# Patient Record
Sex: FEMALE | Race: ASIAN | Employment: FULL TIME | URBAN - METROPOLITAN AREA
[De-identification: names, ages, dates, MRNs, and addresses within clinical notes are randomized per-mention and may not be internally consistent; named-entity substitution may affect disease eponyms.]

---

## 2021-06-24 LAB
HBSAG, EXTERNAL: NEGATIVE
HIV, EXTERNAL: NEGATIVE
RPR, EXTERNAL: NONREACTIVE
RUBELLA, EXTERNAL: NORMAL
TYPE, ABO & RH, EXTERNAL: NORMAL

## 2021-08-02 PROBLEM — Q12.0 CONGENITAL CATARACT: Status: ACTIVE | Noted: 2019-06-27

## 2021-08-02 PROBLEM — Z34.00 ENCOUNTER FOR SUPERVISION OF NORMAL FIRST PREGNANCY: Status: ACTIVE | Noted: 2021-07-15

## 2021-08-02 PROBLEM — O09.92 HIGH-RISK PREGNANCY IN SECOND TRIMESTER: Status: ACTIVE | Noted: 2021-08-02

## 2021-08-02 PROBLEM — Z14.8 CARRIER OF FRAGILE X SYNDROME: Status: ACTIVE | Noted: 2021-08-02

## 2021-08-02 PROBLEM — Z87.798 HISTORY OF CONGENITAL CATARACT: Status: ACTIVE | Noted: 2019-06-27

## 2021-08-08 PROBLEM — Z3A.16 16 WEEKS GESTATION OF PREGNANCY: Status: ACTIVE | Noted: 2021-08-08

## 2021-12-21 LAB — GRBS, EXTERNAL: NEGATIVE

## 2022-01-10 ENCOUNTER — HOSPITAL ENCOUNTER (INPATIENT)
Age: 32
LOS: 2 days | Discharge: HOME | End: 2022-01-12
Attending: OBSTETRICS & GYNECOLOGY | Admitting: OBSTETRICS & GYNECOLOGY
Payer: COMMERCIAL

## 2022-01-10 DIAGNOSIS — R52 PAIN: Primary | ICD-10-CM

## 2022-01-10 PROBLEM — R10.9 ABDOMINAL PAIN DURING PREGNANCY IN THIRD TRIMESTER: Status: ACTIVE | Noted: 2022-01-10

## 2022-01-10 PROBLEM — O26.893 ABDOMINAL PAIN DURING PREGNANCY IN THIRD TRIMESTER: Status: ACTIVE | Noted: 2022-01-10

## 2022-01-10 LAB
ABO + RH BLD: NORMAL
BLOOD GROUP ANTIBODIES SERPL: NORMAL
ERYTHROCYTE [DISTWIDTH] IN BLOOD BY AUTOMATED COUNT: 12.8 % (ref 11.9–14.6)
HCT VFR BLD AUTO: 38 % (ref 35.8–46.3)
HGB BLD-MCNC: 12.8 G/DL (ref 11.7–15.4)
MCH RBC QN AUTO: 30.8 PG (ref 26.1–32.9)
MCHC RBC AUTO-ENTMCNC: 33.7 G/DL (ref 31.4–35)
MCV RBC AUTO: 91.3 FL (ref 79.6–97.8)
NRBC # BLD: 0 K/UL (ref 0–0.2)
PLATELET # BLD AUTO: 300 K/UL (ref 150–450)
PMV BLD AUTO: 9.6 FL (ref 9.4–12.3)
RBC # BLD AUTO: 4.16 M/UL (ref 4.05–5.2)
SPECIMEN EXP DATE BLD: NORMAL
WBC # BLD AUTO: 13.8 K/UL (ref 4.3–11.1)

## 2022-01-10 PROCEDURE — 84112 EVAL AMNIOTIC FLUID PROTEIN: CPT

## 2022-01-10 PROCEDURE — 59025 FETAL NON-STRESS TEST: CPT

## 2022-01-10 PROCEDURE — 75410000000 HC DELIVERY VAGINAL/SINGLE

## 2022-01-10 PROCEDURE — 36415 COLL VENOUS BLD VENIPUNCTURE: CPT

## 2022-01-10 PROCEDURE — 2709999900 HC NON-CHARGEABLE SUPPLY

## 2022-01-10 PROCEDURE — 74011250637 HC RX REV CODE- 250/637: Performed by: OBSTETRICS & GYNECOLOGY

## 2022-01-10 PROCEDURE — 77030018846 HC SOL IRR STRL H20 ICUM -A: Performed by: STUDENT IN AN ORGANIZED HEALTH CARE EDUCATION/TRAINING PROGRAM

## 2022-01-10 PROCEDURE — 4A1HXCZ MONITORING OF PRODUCTS OF CONCEPTION, CARDIAC RATE, EXTERNAL APPROACH: ICD-10-PCS | Performed by: STUDENT IN AN ORGANIZED HEALTH CARE EDUCATION/TRAINING PROGRAM

## 2022-01-10 PROCEDURE — 86900 BLOOD TYPING SEROLOGIC ABO: CPT

## 2022-01-10 PROCEDURE — 74011000250 HC RX REV CODE- 250: Performed by: OBSTETRICS & GYNECOLOGY

## 2022-01-10 PROCEDURE — 75410000002 HC LABOR FEE PER 1 HR

## 2022-01-10 PROCEDURE — 0HQ9XZZ REPAIR PERINEUM SKIN, EXTERNAL APPROACH: ICD-10-PCS | Performed by: STUDENT IN AN ORGANIZED HEALTH CARE EDUCATION/TRAINING PROGRAM

## 2022-01-10 PROCEDURE — 75410000003 HC RECOV DEL/VAG/CSECN EA 0.5 HR

## 2022-01-10 PROCEDURE — 74011250637 HC RX REV CODE- 250/637: Performed by: STUDENT IN AN ORGANIZED HEALTH CARE EDUCATION/TRAINING PROGRAM

## 2022-01-10 PROCEDURE — 77030011943: Performed by: STUDENT IN AN ORGANIZED HEALTH CARE EDUCATION/TRAINING PROGRAM

## 2022-01-10 PROCEDURE — 85027 COMPLETE CBC AUTOMATED: CPT

## 2022-01-10 PROCEDURE — 77030003028 HC SUT VCRL J&J -A: Performed by: STUDENT IN AN ORGANIZED HEALTH CARE EDUCATION/TRAINING PROGRAM

## 2022-01-10 PROCEDURE — 99284 EMERGENCY DEPT VISIT MOD MDM: CPT | Performed by: OBSTETRICS & GYNECOLOGY

## 2022-01-10 PROCEDURE — 65270000029 HC RM PRIVATE

## 2022-01-10 RX ORDER — MINERAL OIL
120 OIL (ML) ORAL
Status: DISCONTINUED | OUTPATIENT
Start: 2022-01-10 | End: 2022-01-10

## 2022-01-10 RX ORDER — PRENATAL VIT 96/IRON FUM/FOLIC 27MG-0.8MG
1 TABLET ORAL DAILY
Status: DISCONTINUED | OUTPATIENT
Start: 2022-01-11 | End: 2022-01-12 | Stop reason: HOSPADM

## 2022-01-10 RX ORDER — OXYTOCIN/RINGER'S LACTATE 30/500 ML
87.3 PLASTIC BAG, INJECTION (ML) INTRAVENOUS AS NEEDED
Status: DISCONTINUED | OUTPATIENT
Start: 2022-01-10 | End: 2022-01-10

## 2022-01-10 RX ORDER — OXYCODONE HYDROCHLORIDE 5 MG/1
5 TABLET ORAL
Status: DISCONTINUED | OUTPATIENT
Start: 2022-01-10 | End: 2022-01-12 | Stop reason: HOSPADM

## 2022-01-10 RX ORDER — OXYTOCIN/RINGER'S LACTATE 30/500 ML
10 PLASTIC BAG, INJECTION (ML) INTRAVENOUS AS NEEDED
Status: COMPLETED | OUTPATIENT
Start: 2022-01-10 | End: 2022-01-10

## 2022-01-10 RX ORDER — ONDANSETRON 4 MG/1
4 TABLET, ORALLY DISINTEGRATING ORAL
Status: DISCONTINUED | OUTPATIENT
Start: 2022-01-10 | End: 2022-01-12 | Stop reason: HOSPADM

## 2022-01-10 RX ORDER — SODIUM CHLORIDE 0.9 % (FLUSH) 0.9 %
5-40 SYRINGE (ML) INJECTION EVERY 8 HOURS
Status: DISCONTINUED | OUTPATIENT
Start: 2022-01-10 | End: 2022-01-10

## 2022-01-10 RX ORDER — ADHESIVE BANDAGE
30 BANDAGE TOPICAL DAILY PRN
Status: DISCONTINUED | OUTPATIENT
Start: 2022-01-10 | End: 2022-01-12 | Stop reason: HOSPADM

## 2022-01-10 RX ORDER — DEXTROSE, SODIUM CHLORIDE, SODIUM LACTATE, POTASSIUM CHLORIDE, AND CALCIUM CHLORIDE 5; .6; .31; .03; .02 G/100ML; G/100ML; G/100ML; G/100ML; G/100ML
125 INJECTION, SOLUTION INTRAVENOUS CONTINUOUS
Status: DISCONTINUED | OUTPATIENT
Start: 2022-01-10 | End: 2022-01-10

## 2022-01-10 RX ORDER — POLYETHYLENE GLYCOL 3350 17 G/17G
17 POWDER, FOR SOLUTION ORAL DAILY
Status: DISCONTINUED | OUTPATIENT
Start: 2022-01-10 | End: 2022-01-12 | Stop reason: HOSPADM

## 2022-01-10 RX ORDER — IBUPROFEN 800 MG/1
800 TABLET ORAL EVERY 6 HOURS
Status: DISCONTINUED | OUTPATIENT
Start: 2022-01-10 | End: 2022-01-12 | Stop reason: HOSPADM

## 2022-01-10 RX ORDER — ACETAMINOPHEN 500 MG
1000 TABLET ORAL EVERY 6 HOURS
Status: DISCONTINUED | OUTPATIENT
Start: 2022-01-10 | End: 2022-01-12 | Stop reason: HOSPADM

## 2022-01-10 RX ORDER — LIDOCAINE HYDROCHLORIDE 20 MG/ML
JELLY TOPICAL
Status: DISCONTINUED | OUTPATIENT
Start: 2022-01-10 | End: 2022-01-10

## 2022-01-10 RX ORDER — LIDOCAINE HYDROCHLORIDE 10 MG/ML
1 INJECTION INFILTRATION; PERINEURAL
Status: COMPLETED | OUTPATIENT
Start: 2022-01-10 | End: 2022-01-10

## 2022-01-10 RX ORDER — SODIUM CHLORIDE 0.9 % (FLUSH) 0.9 %
5-40 SYRINGE (ML) INJECTION AS NEEDED
Status: DISCONTINUED | OUTPATIENT
Start: 2022-01-10 | End: 2022-01-10

## 2022-01-10 RX ORDER — DIPHENHYDRAMINE HCL 25 MG
25 CAPSULE ORAL
Status: DISCONTINUED | OUTPATIENT
Start: 2022-01-10 | End: 2022-01-12 | Stop reason: HOSPADM

## 2022-01-10 RX ORDER — SIMETHICONE 80 MG
80 TABLET,CHEWABLE ORAL
Status: DISCONTINUED | OUTPATIENT
Start: 2022-01-10 | End: 2022-01-12 | Stop reason: HOSPADM

## 2022-01-10 RX ORDER — OXYCODONE HYDROCHLORIDE 5 MG/1
10 TABLET ORAL
Status: DISCONTINUED | OUTPATIENT
Start: 2022-01-10 | End: 2022-01-12 | Stop reason: HOSPADM

## 2022-01-10 RX ORDER — BUTORPHANOL TARTRATE 2 MG/ML
1 INJECTION INTRAMUSCULAR; INTRAVENOUS
Status: DISCONTINUED | OUTPATIENT
Start: 2022-01-10 | End: 2022-01-10

## 2022-01-10 RX ORDER — POLYETHYLENE GLYCOL 3350 17 G/17G
17 POWDER, FOR SOLUTION ORAL DAILY
Status: DISCONTINUED | OUTPATIENT
Start: 2022-01-11 | End: 2022-01-10

## 2022-01-10 RX ADMIN — IBUPROFEN 800 MG: 800 TABLET, FILM COATED ORAL at 15:10

## 2022-01-10 RX ADMIN — OXYCODONE 10 MG: 5 TABLET ORAL at 23:01

## 2022-01-10 RX ADMIN — IBUPROFEN 800 MG: 800 TABLET, FILM COATED ORAL at 22:58

## 2022-01-10 RX ADMIN — POLYETHYLENE GLYCOL 3350 17 G: 17 POWDER, FOR SOLUTION ORAL at 16:26

## 2022-01-10 RX ADMIN — ACETAMINOPHEN 1000 MG: 500 TABLET, FILM COATED ORAL at 20:20

## 2022-01-10 RX ADMIN — WITCH HAZEL 1 PAD: 500 SOLUTION RECTAL; TOPICAL at 16:26

## 2022-01-10 RX ADMIN — Medication 1 SPRAY: at 16:27

## 2022-01-10 RX ADMIN — LIDOCAINE HYDROCHLORIDE 0.1 ML: 10 INJECTION, SOLUTION INFILTRATION; PERINEURAL at 13:04

## 2022-01-10 RX ADMIN — ACETAMINOPHEN 1000 MG: 500 TABLET, FILM COATED ORAL at 15:10

## 2022-01-10 RX ADMIN — Medication 10000 MILLI-UNITS: at 13:04

## 2022-01-10 NOTE — PROGRESS NOTES
Progress note:    Pt is a  at 38w6d admitted overnight for SROM at 0400. Pt is laboring naturally.  present in delivery room. Birthing plan reviewed. Pregnancy c/b fragile X carrier, VZV non-immune.     SVE: 6cm per RN at 0700    FHTs: reactive and reassuring, Cat 1, baseline 130s  TOCO: cxns every 2-3 mins      MD Glendy Medina

## 2022-01-10 NOTE — PROGRESS NOTES
0700 Bedside and Verbal shift change report given to ZAC Molina RN (oncoming nurse) by Jamari Barksdale. Jeromy Bailon RN (offgoing nurse). Report included the following information SBAR. SVE per pt. Request. 6/100/0. Pt. Desires natural childbirth at this time. Ning Grimm, at bedside, helping pt to cope with contractions. PIV saline locked. FHR tracing via [facility] telemetry. Pt. Getting into shower for comfort at this time. 3141 Dr. Danika Pineda at bedside reviewing FHR tracing. No new orders received. 6300. Pt. Called out feeling rectal pressure. SVE per RN 7/100/0.   7305 Pt. Called out feeling the urge to push. SVE per RN 9/100/0. Dr. Danika Pineda updated per RN.  6555 Pt. Called out feeling the urge to push. SVE per RN 9/100/0.  1020 Pt called out feeing the urge to push. SVE per RN. 9/100/0.   9430 [place] 96 per RN. Ant. Lip. 36 Dr. Dainka Pineda at bedside reviewing FHR tracing. SVE per Dr. Danika Pineda. Anterior lip. 1130 Sterile straight cath per RN. 200 cc clear yellow urine drained. Dr. Danika Pineda at bedside reviewing FHR tracing. SVE per Dr. Danika Pineda. Pt. Complete. Dr. Dainka Pineda and ZAC Molina RN remain at bedside continuously ssessing maternal and fetal status while pushing. 1300 SVE of viable girl. See delivery summary.  Parents request to delay all infant assessments and medications at this time

## 2022-01-10 NOTE — PROGRESS NOTES
To pt's room for cervical check. SVE: ant lip/0, lip is soft and reducible with pushing.    FHTs: cat 1, baseline 120s    Plan:  Start pushing  Recheck in 30 mins or PRN      Ian Wu MD  [address]

## 2022-01-10 NOTE — L&D DELIVERY NOTE
Delivery Note    Obstetrician:  Marylene Jesus, MD    Assistant: none    Pre-Delivery Diagnosis: Term pregnancy, Spontaneous labor, Single fetus and Pregnancy complicated by: fragile X carrier, VZV non-immune    Post-Delivery Diagnosis: Living  infant(s) and Female    Intrapartum Event: None    Procedure: Spontaneous vaginal delivery    Epidural: NO: natural birth    Monitor:  Fetal Heart Tones - External and Uterine Contractions - External    Indications for instrumental delivery: none    Estimated Blood Loss: 285cc    Episiotomy: no    Laceration(s):  1st degree    Laceration(s) repair: YES, 2-0 vicryl in the standard fashion    Presentation: Cephalic    Fetal Description: eagle    Fetal Position: Left Occiput Posterior    BABY INFORMATION  NAME:   Information for the patient's :  Alida Brown [[id number]]   GIRL Carey Mary     SEX: female  DATE AND TIME OF BIRTH:   Information for the patient's :  Alida Brown [[id number]]   1/10/2022    at   Information for the patient's :  Alida Brown [[id number]]   [facility]:   Information for the patient's :  Alida Brown [[id number]]       and   Information for the patient's :  Alida Brown [[id number]]      .   APGARS:  Information for the patient's :  Alida Brown [[id number]]         Information for the patient's :  Alida Brown [[id number]]          Umbilical Cord: 3 vessels present, cord blood sent to lab    Specimens: none           Complications:  none           Lab Results   Component Value Date/Time    ABO/Rh(D) B POSITIVE 01/10/2022 06:08 AM    Antibody screen NEG 01/10/2022 06:08 AM    Rubella, External immune 2021 12:00 AM    GrBStrep, External negative 2021 12:00 AM    ABO,Rh B positive 2021 12:00 AM            Attending Attestation: I was present and scrubbed for the entire procedure           Delivery Summary    Patient: Muna Ahumada MRN: [id number]  SSN: [id number]    YOB: 1990  Age: 27 y.o. Sex: female       Information for the patient's :  Jessica Tripp [[id number]]       Labor Events:    Labor: No    Steroids: None   Cervical Ripening Date/Time:       Cervical Ripening Type: None   Antibiotics During Labor: No   Rupture Identifier:      Rupture Date/Time: 1/10/2022 4:30 AM   Rupture Type: SROM   Amniotic Fluid Volume: Moderate    Amniotic Fluid Description:      Amniotic Fluid Odor:      Induction: None       Induction Date/Time:        Indications for Induction:      Augmentation: None   Augmentation Date/Time:      Indications for Augmentation:     Labor complications: None       Additional complications:        Delivery Events:  Indications For Episiotomy:     Episiotomy: None   Perineal Laceration(s): None   Repaired:     Periurethral Laceration Location:      Repaired:     Labial Laceration Location:     Repaired:     Sulcal Laceration Location:     Repaired:     Vaginal Laceration Location:     Repaired:     Cervical Laceration Location:     Repaired:     Repair Suture: None   Number of Repair Packets:     Estimated Blood Loss (ml):  ml   Quantitative Blood Loss (ml)                Delivery Date: 1/10/2022    Delivery Time: 1:00 PM  Delivery Type: Vaginal, Spontaneous  Sex:  Female    Gestational Age: 38w7d   Delivery Clinician:  Phill Nash  Living Status: Living   Delivery Location: L&D            APGARS  One minute Five minutes Ten minutes   Skin color: 0   1        Heart rate: 2   2        Grimace: 2   2        Muscle tone: 2   2        Breathin   2        Totals: 8   9            Presentation: Vertex    Position: Left Occiput Posterior  Resuscitation Method:  Suctioning-bulb; Tactile Stimulation     Meconium Stained: None      Cord Information: 3 Vessels  Complications: None  Cord around:    Delayed cord clamping?  Yes  Cord clamped date/time:1/10/2022  1:04 PM  Disposition of Cord Blood: Lab    Blood Gases Sent?: Yes    Placenta:  Date/Time: 1/10/2022  1:04 PM  Removal: Spontaneous      Appearance: Normal;Intact     [place] Measurements:  Birth Weight:        Birth Length:        Head Circumference:        Chest Circumference:       Abdominal Girth: Other Providers:   Marques GRECO;ANDRES SANTOS;ORLANDO CABRALES;OSCAR MATTSON, Obstetrician;Primary Nurse;Charge Nurse;Scrub Tech           Group B Strep:   Lab Results   Component Value Date/Time    GrAGNIESZKAtrep, External negative 2021 12:00 AM     Information for the patient's :  Lois Sandra [[id number]]   No results found for: ABORH, PCTABR, PCTDIG, BILI, ABORHEXT, ABORH     No results for input(s): PCO2CB, PO2CB, HCO3I, SO2I, IBD, PTEMPI, SPECTI, PHICB, ISITE, IDEV, IALLEN in the last 72 hours.

## 2022-01-10 NOTE — LACTATION NOTE

## 2022-01-10 NOTE — PROGRESS NOTES
Pt presents to [facility] with c/o ctx and SROM at 0430 today. Pt denies VB, and states baby is moving well. Dr. Garth Quintero notified of pt arrival and SVE. Admit to labor.

## 2022-01-10 NOTE — H&P
[facility] History & Physical    Name: Fabiola Hart MRN: [id number]  SSN: [id number]    YOB: 1990  Age: 27 y.o. Sex: female        Subjective:     Estimated Date of Delivery: 22  OB History    Para Term  AB Living   1             SAB IAB Ectopic Molar Multiple Live Births                    # Outcome Date GA Lbr Tirso/2nd Weight Sex Delivery Anes PTL Lv   1 Current                Ms. Diana Meadows is admitted with pregnancy at 38w6d for active labor. She had a gush of clear fluid at 0430 and contractions began soon after that. Prenatal course was complicated by congenital cataracts, abnormal genetic carrier status fragile X syndrome. Please see prenatal records for details. She denies any F/C or sick contacts, no HA/visual changes or other preeclampsia symptoms, no VB. Endorses active fetus. Past Medical History:   Diagnosis Date    Abnormal Papanicolaou smear of cervix     HPV in female     PMS (premenstrual syndrome)      Past Surgical History:   Procedure Laterality Date    HX WISDOM TEETH EXTRACTION       Social History     Occupational History    Not on file   Tobacco Use    Smoking status: Never Smoker    Smokeless tobacco: Never Used   Substance and Sexual Activity    Alcohol use: No    Drug use: No    Sexual activity: Yes     Partners: Male     Birth control/protection: Pill     Family History   Problem Relation Age of Onset   Rider Breast Cancer Mother     Hypertension Mother     Diabetes Father     Breast Cancer Paternal Aunt     Ovarian Cancer Paternal Aunt     Breast Cancer Paternal Grandmother     Colon Cancer Neg Hx        Allergies   Allergen Reactions    Thimerosal Unknown (comments)     Prior to Admission medications    Medication Sig Start Date End Date Taking? Authorizing Provider   PNV#16-Iron Fum & PS-FA-OM-3 (Taco-C DHA) 35-1-200 mg cap Taco-C DHA 35 mg-1 mg-200 mg capsule   Take 1 capsule every day by oral route as directed for 90 days.    Yes Provider, Historical   Cetirizine (ZYRTEC) 10 mg cap Take  by mouth. Patient not taking: Reported on 1/10/2022    Provider, Historical        Review of Systems: A comprehensive review of systems was negative except for that written in the HPI. Objective:     Vitals:  Vitals:    01/10/22 0544 01/10/22 0617   BP: 131/72    Pulse: 93    Height:  5' 1\" (1.549 m)        Physical Exam:  Constitutional: The patient appears well, alert, oriented x 3. Cardiovascular: Heart RRR, no murmurs. Respiratory: Lungs clear, no respiratory distress  GI: Abdomen soft, nontender, no guarding  No fundal tenderness  Musculoskeletal: no cva tenderness  Upper ext: no edema, reflexes +2  Lower ext: no edema, neg jeevan's, reflexes +2  Skin: no rashes or lesions  Psychiatric:Mood/ Affect: appropriate  Genitourinary: SVE: 7/912/4 cephalic by palpation  Membranes:  Spontaneous Rupture of Membranes; Amniotic Fluid: clear fluid  Fetal Heart Rate: Reactive  Baseline: 140 beats per minute  Variability: moderate  Accelerations: yes  Decelerations: none  Uterine contractions: regular, every 2-4 minutes    Prenatal Labs:   No results found for: ABORH, RUBELLAEXT, GRBSEXT, HBSAGEXT, HIVEXT, RPREXT, GONNOEXT, CHLAMEXT, ABORHEXT, RUBELLAEXT, GRBSEXT, HBSAGEXT, HIVEXT, RPREXT, GONNOEXT, CHLAMEXT, ABORHEXT, RUBELLAEXT, GRBSEXT, HBSAGEXT, HIVEXT, RPREXT, GONNOEXT, CHLAMEXT      Assessment/Plan:     Active Problems:    Abdominal pain during pregnancy in third trimester (1/10/2022)     Carrier of fragile X syndrome    Plan: Admit for Reassuring fetal status, Labor  Progressing normally  Continue expectant management, Continue plan for vaginal delivery. Group B Strep was negative per patient report. Reviewed assessment and plan of care with Dr. Margi Stafford who is in agreement, will assume care and will confirm GBS status. Patient is s/p MFM consultation and targeted scan normal per notes.

## 2022-01-10 NOTE — PROGRESS NOTES
0700 Bedside and Verbal shift change report given to ZAC Molina RN (oncoming nurse) by [facility]. Otilia Alpers RN (offgoing nurse). Report included the following information SBAR. SVE per pt. Request. 6/100/0. Pt. Desires natural childbirth at this time. Inocente Pitt, at bedside, helping pt to cope with contractions. PIV saline locked. [facility]. Pt. Getting into shower for comfort at this time. 7900 Dr. Sandi Alvarenga at bedside reviewing FHR tracing. No new orders received. 6275. Pt. Called out feeling rectal pressure. SVE per RN 7/100/0.   1147 Pt. Called out feeling the urge to push. SVE per RN 9/100/0. Dr. Sandi Alvarenga updated per RN.  0174 Pt. Called out feeling the urge to push. SVE per RN 9/100/0.  1020 Pt called out feeing the urge to push. SVE per RN. 9/100/0.

## 2022-01-10 NOTE — PROGRESS NOTES
Shift assessment complete. See flowsheet for details. POC reviewed with pt and pt verbalized understanding. Pt oriented to room and has call light within reach. Koby Ramsay, at bedside with  supporting pt.

## 2022-01-10 NOTE — PROGRESS NOTES
SBAR IN Report: Mother    Verbal report received from Trey Cagle RN on this patient, who is now being transferred from L&D  (unit) for routine progression of care. The patient is not wearing a green \"Anesthesia-Duramorph\" band. Report consisted of patient's Situation, Background, Assessment and Recommendations (SBAR).  ID bands were compared with the identification form, and verified with the patient and transferring nurse. Information from the SBAR and the Zaida Report was reviewed with the transferring nurse; opportunity for questions and clarification provided.

## 2022-01-10 NOTE — LACTATION NOTE
This note was copied from a baby's chart. Mom requesting to see lactation. In to see mom and infant for the first time. Mom stated that infant latched and nursed on her left breast for first feeding. She was concerned that infant couldn't breathe while nursing. Reviewed with her first that infant's can breathe while nursing. Then reviewed positioning with mom and dad. Infant asleep during my visit. Reviewed the expectations of the firs 24 hours and answered questions. Lactation consultant will follow up tomorrow.

## 2022-01-11 PROCEDURE — 74011250637 HC RX REV CODE- 250/637: Performed by: STUDENT IN AN ORGANIZED HEALTH CARE EDUCATION/TRAINING PROGRAM

## 2022-01-11 PROCEDURE — 74011250637 HC RX REV CODE- 250/637: Performed by: OBSTETRICS & GYNECOLOGY

## 2022-01-11 PROCEDURE — 65270000029 HC RM PRIVATE

## 2022-01-11 RX ADMIN — IBUPROFEN 800 MG: 800 TABLET, FILM COATED ORAL at 06:52

## 2022-01-11 RX ADMIN — ACETAMINOPHEN 1000 MG: 500 TABLET, FILM COATED ORAL at 02:19

## 2022-01-11 RX ADMIN — ACETAMINOPHEN 1000 MG: 500 TABLET, FILM COATED ORAL at 08:40

## 2022-01-11 RX ADMIN — OXYCODONE 10 MG: 5 TABLET ORAL at 10:37

## 2022-01-11 RX ADMIN — POLYETHYLENE GLYCOL 3350 17 G: 17 POWDER, FOR SOLUTION ORAL at 08:40

## 2022-01-11 RX ADMIN — PRENATAL VIT W/ FE FUMARATE-FA TAB 27-0.8 MG 1 TABLET: 27-0.8 TAB at 08:40

## 2022-01-11 RX ADMIN — IBUPROFEN 800 MG: 800 TABLET, FILM COATED ORAL at 23:35

## 2022-01-11 RX ADMIN — IBUPROFEN 800 MG: 800 TABLET, FILM COATED ORAL at 13:42

## 2022-01-11 RX ADMIN — IBUPROFEN 800 MG: 800 TABLET, FILM COATED ORAL at 19:43

## 2022-01-11 RX ADMIN — ACETAMINOPHEN 1000 MG: 500 TABLET, FILM COATED ORAL at 16:22

## 2022-01-11 NOTE — PROGRESS NOTES
Chart reviewed - first time parent. SW met with patient while social distancing w/appropriate PPE.  provided education on [facility]partum [place] Home Visit. Family would like to participate in program.  Referral will be made at discharge. Patient given informational packet on  mood & anxiety disorders (resources/education). Family denies any additional needs from  at this time. Family has 's contact information should any needs/questions arise.     HEATHER Kim, [address]   [phone]

## 2022-01-11 NOTE — LACTATION NOTE
This note was copied from a baby's chart. Assisted with breastfeeding in football on L. Baby fed fairly well. Saw feed on R in cross cradle as well. Demonstrated manual lip flange. Encouraged frequent feeding and watch output. Mom getting sore, encouraged changing position, lip flange, lanolin or olive/coconut oil. Suggested get baby started sucking on finger to help get in a good rhythm. Mom can hand express before feeding to get milk started and pull out nipple. Colostrum is thick and there is not much volume, so baby can put a lot of pressure on the nipple before swallowing. Once milk volume is in and flowing well, pressure should decrease. Encouraged mom to report any nipple damage or bleeding beyond soreness. Baby has some caput and head scabbing.

## 2022-01-11 NOTE — LACTATION NOTE

## 2022-01-12 VITALS
SYSTOLIC BLOOD PRESSURE: 127 MMHG | TEMPERATURE: 98.4 F | OXYGEN SATURATION: 96 % | DIASTOLIC BLOOD PRESSURE: 84 MMHG | RESPIRATION RATE: 26 BRPM | HEART RATE: 74 BPM | HEIGHT: 61 IN | BODY MASS INDEX: 20.03 KG/M2

## 2022-01-12 PROCEDURE — 74011250637 HC RX REV CODE- 250/637: Performed by: OBSTETRICS & GYNECOLOGY

## 2022-01-12 PROCEDURE — 2709999900 HC NON-CHARGEABLE SUPPLY

## 2022-01-12 PROCEDURE — 90471 IMMUNIZATION ADMIN: CPT

## 2022-01-12 PROCEDURE — 90686 IIV4 VACC NO PRSV 0.5 ML IM: CPT | Performed by: STUDENT IN AN ORGANIZED HEALTH CARE EDUCATION/TRAINING PROGRAM

## 2022-01-12 PROCEDURE — 74011250637 HC RX REV CODE- 250/637: Performed by: STUDENT IN AN ORGANIZED HEALTH CARE EDUCATION/TRAINING PROGRAM

## 2022-01-12 PROCEDURE — 74011000636 HC RX REV CODE- 636: Performed by: STUDENT IN AN ORGANIZED HEALTH CARE EDUCATION/TRAINING PROGRAM

## 2022-01-12 RX ORDER — ACETAMINOPHEN 500 MG
1000 TABLET ORAL EVERY 6 HOURS
Qty: 50 TABLET | Refills: 0 | Status: SHIPPED | OUTPATIENT
Start: 2022-01-12

## 2022-01-12 RX ORDER — IBUPROFEN 800 MG/1
800 TABLET ORAL EVERY 8 HOURS
Qty: 90 TABLET | Refills: 0 | Status: SHIPPED | OUTPATIENT
Start: 2022-01-12

## 2022-01-12 RX ORDER — OXYCODONE HYDROCHLORIDE 5 MG/1
5 TABLET ORAL
Qty: 5 TABLET | Refills: 0 | Status: SHIPPED | OUTPATIENT
Start: 2022-01-12 | End: 2022-01-15

## 2022-01-12 RX ADMIN — INFLUENZA VIRUS VACCINE 0.5 ML: 15; 15; 15; 15 SUSPENSION INTRAMUSCULAR at 11:37

## 2022-01-12 RX ADMIN — ACETAMINOPHEN 1000 MG: 500 TABLET, FILM COATED ORAL at 08:37

## 2022-01-12 RX ADMIN — WITCH HAZEL 1 PAD: 500 SOLUTION RECTAL; TOPICAL at 11:45

## 2022-01-12 RX ADMIN — PRENATAL VIT W/ FE FUMARATE-FA TAB 27-0.8 MG 1 TABLET: 27-0.8 TAB at 08:37

## 2022-01-12 RX ADMIN — IBUPROFEN 800 MG: 800 TABLET, FILM COATED ORAL at 11:02

## 2022-01-12 RX ADMIN — POLYETHYLENE GLYCOL 3350 17 G: 17 POWDER, FOR SOLUTION ORAL at 08:37

## 2022-01-12 RX ADMIN — ACETAMINOPHEN 1000 MG: 500 TABLET, FILM COATED ORAL at 03:14

## 2022-01-12 NOTE — LACTATION NOTE
This note was copied from a baby's chart. In to see mom and infant for discharge. Assisted mom w/ her waking up baby and getting her feeding on right breast since mom states baby had not fed well since 0200. Overall latching and feeding well. Mom does c/o some nipple soreness. Reviewed nipple care and how to manage period of engorgement. Discussed discharge info. Reviewed what to do if baby not latching and feeding well, pump and give back EBM and showed how to use syringes. Monitor output closely. No further needs at this time.

## 2022-01-12 NOTE — DISCHARGE INSTRUCTIONS
Patient Education        Vaginal Childbirth: Care Instructions  Overview     Vaginal birth means delivering a baby through the birth canal (vagina). During labor, the uterus tightens (contracts) regularly to thin and open the cervix and to push the baby out through the birth canal.  Your body will slowly heal in the next few weeks. It's easy to get too tired and overwhelmed during the first weeks after your baby is born. Changes in your hormones can shift your mood without warning. You may find it hard to meet the extra demands on your energy and time. Take it easy on yourself. Follow-up care is a key part of your treatment and safety. Be sure to make and go to all appointments, and call your doctor if you are having problems. It's also a good idea to know your test results and keep a list of the medicines you take. How can you care for yourself at home? Vaginal bleeding and cramps  · After delivery, you will have a bloody discharge from your vagina. This will turn pink within a week and then white or yellow after about 10 days. It may last for 2 to 4 weeks or longer, until the uterus has healed. Use sanitary pads until you stop bleeding. Using pads makes it easier to monitor your bleeding. · Don't worry if you pass some blood clots, as long as they are smaller than a golf ball. If you have a tear or stitches in your vaginal area, change the pad at least every 4 hours. This will help prevent soreness and infection. · You may have cramps for the first few days after childbirth. These are normal and occur as the uterus shrinks to normal size. Take an over-the-counter pain medicine, such as acetaminophen (Tylenol), ibuprofen (Advil, Motrin), or naproxen (Aleve), for cramps. Read and follow all instructions on the label. Do not take aspirin, because it can cause more bleeding. · Do not take two or more pain medicines at the same time unless the doctor told you to.  Many pain medicines have acetaminophen, which is Tylenol. Too much acetaminophen (Tylenol) can be harmful. Stitches  · If you have stitches, they will dissolve on their own and don't need to be removed. Follow your doctor's instructions for cleaning the stitched area. · Put ice or a cold pack on your painful area for 10 to 20 minutes at a time, several times a day, for the first few days. Put a thin cloth between the ice and your skin. · Sit in a few inches of warm water (sitz bath) 3 times a day and after bowel movements. The warm water helps with pain and itching. If you don't have a tub, a warm shower might help. Breast fullness  · Your breasts may overfill (engorge) in the first few days after delivery. To help milk flow and to relieve pain, warm your breasts in the shower or by using warm, moist towels before nursing. · If you aren't nursing, don't put warmth on your breasts or touch your breasts. Wear a bra that fits well and use ice until the fullness goes away. This usually takes 2 to 3 days. · Put ice or a cold pack on your breast after nursing to reduce swelling and pain. Put a thin cloth between the ice and your skin. Activity  · Eat a balanced diet. Don't try to lose weight by cutting calories. Keep taking your prenatal vitamins, or take a multivitamin. · Get as much rest as you can. Try to take naps when your baby sleeps during the day. · Get some exercise every day. But don't do any heavy exercise until your doctor says it is okay. · Wait until you are healed (about 4 to 6 weeks) before you have sexual intercourse. Your doctor will tell you when it is okay to have sex. · If you don't want to get pregnant, talk to your doctor about birth control. You can get pregnant even before your period returns. Also, you can get pregnant while you are breastfeeding. Mental health  · It's normal to have some sadness, anxiety, sleeplessness, and mood swings after you go home.  If you feel upset or hopeless for more than a few days or are having trouble doing the things you need to do, talk to your doctor. Constipation and hemorrhoids  · Drink plenty of fluids. If you have kidney, heart, or liver disease and have to limit fluids, talk with your doctor before you increase the amount of fluids you drink. · Eat plenty of fiber each day. Have a bran muffin or bran cereal for breakfast. Try eating a piece of fruit for a mid-afternoon snack. · For painful, itchy hemorrhoids, put ice or a cold pack on the area several times a day for 10 minutes at a time. Follow this by putting a warm compress on the area for another 10 to 20 minutes or by sitting in a shallow, warm bath. When should you call for help? Call 911  anytime you think you may need emergency care. For example, call if:    · You have thoughts of harming yourself, your baby, or another person.     · You passed out (lost consciousness).     · You have chest pain, are short of breath, or cough up blood.     · You have a seizure. Call your doctor now or seek immediate medical care if:    · You have severe vaginal bleeding.     · You are dizzy or lightheaded, or you feel like you may faint.     · You have a fever.     · You have new or more pain in your belly or pelvis.     · You have symptoms of a blood clot in your leg (called a deep vein thrombosis), such as:  ? Pain in the calf, back of the knee, thigh, or groin. ? Redness and swelling in your leg or groin.     · You have signs of preeclampsia, such as:  ? Sudden swelling of your face, hands, or feet. ? New vision problems (such as dimness, blurring, or seeing spots). ? A severe headache. Watch closely for changes in your health, and be sure to contact your doctor if:    · Your vaginal bleeding seems to be getting heavier.     · You have new or worse vaginal discharge.     · You feel sad, anxious, or hopeless for more than a few days.     · You do not get better as expected. Where can you learn more?   Go to http://www.gray.com/  Enter K754 in the search box to learn more about \"Vaginal Childbirth: Care Instructions. \"  Current as of: June 16, 2021               Content Version: 13.0  © 8892-6326 [employer]. Care instructions adapted under license by [employer] (which disclaims liability or warranty for this information). If you have questions about a medical condition or this instruction, always ask your healthcare professional. [place] [address] any warranty or liability for your use of this information.

## 2022-01-12 NOTE — DISCHARGE SUMMARY
Obstetrical Discharge Summary     Name: Justine Oglesby MRN: [id number]  SSN: [id number]    YOB: 1990  Age: 27 y.o. Sex: female      Allergies: Thimerosal    Admit Date: 1/10/2022    Discharge Date: 2022     Admitting Physician: Gutierrez Fofana MD     Attending Physician:  Salvatore Arzate MD     * Admission Diagnoses: Abdominal pain during pregnancy in third trimester [O26.893, R10.9]   Active labor at 38wks    * Discharge Diagnoses:   Information for the patient's :  Babita Hemphill [[id number]]   Delivery of a 3.45 kg female infant via Vaginal, Spontaneous on 1/10/2022 at 1:00 PM  by Lidia Masker. Apgars were 8  and 9 . Additional Diagnoses:   Hospital Problems as of 2022 Date Reviewed: 1/10/2022          Codes Class Noted - Resolved POA    Abdominal pain during pregnancy in third trimester ICD-10-CM: O26.893, R10.9  ICD-9-CM: 646.83, 789.00  1/10/2022 - Present Yes             Lab Results   Component Value Date/Time    ABO/Rh(D) B POSITIVE 01/10/2022 06:08 AM    Rubella, External immune 2021 12:00 AM    GrBStrep, External negative 2021 12:00 AM    ABO,Rh B positive 2021 12:00 AM      Immunization History   Administered Date(s) Administered    COVID-19, PFIZER, MRNA, LNP-S, PF, 30MCG/0.3ML DOSE 2021, 2021       * Procedures:   * No surgery found *           * Discharge Condition: good    * Hospital Course: Normal hospital course following the delivery. * Disposition: Home    Discharge Medications:   Current Discharge Medication List      START taking these medications    Details   acetaminophen (TYLENOL) 500 mg tablet Take 2 Tablets by mouth every six (6) hours. Qty: 50 Tablet, Refills: 0  Start date: 2022      ibuprofen (MOTRIN) 800 mg tablet Take 1 Tablet by mouth every eight (8) hours.   Qty: 90 Tablet, Refills: 0  Start date: 2022      oxyCODONE IR (ROXICODONE) 5 mg immediate release tablet Take 1 Tablet by mouth every four (4) hours as needed for Pain for up to 3 days. Max Daily Amount: 30 mg.  Qty: 5 Tablet, Refills: 0  Start date: 1/12/2022, End date: 1/15/2022    Associated Diagnoses: Pain         CONTINUE these medications which have NOT CHANGED    Details   PNV#16-Iron Fum & PS-FA-OM-3 (Taco-C DHA) 35-1-200 mg cap Taco-C DHA 35 mg-1 mg-200 mg capsule   Take 1 capsule every day by oral route as directed for 90 days. Cetirizine (ZYRTEC) 10 mg cap Take  by mouth. * Follow-up Care/Patient Instructions: Activity: No sex for 6 weeks and No driving while on analgesics  Diet: Regular Diet  Wound Care:  Ice to area for comfort    Follow-up Information     Follow up With Specialties Details Why Contact Info    None    None (395) Patient stated that they have no PCP

## 2022-01-12 NOTE — PROGRESS NOTES
Referral made to [facility]  home visit program.    HEATHER Agrawal, [address] [facility]   [phone]

## 2022-01-12 NOTE — LACTATION NOTE
